# Patient Record
Sex: FEMALE | Race: WHITE | Employment: OTHER | URBAN - METROPOLITAN AREA
[De-identification: names, ages, dates, MRNs, and addresses within clinical notes are randomized per-mention and may not be internally consistent; named-entity substitution may affect disease eponyms.]

---

## 2017-04-11 ENCOUNTER — ANESTHESIA EVENT (OUTPATIENT)
Dept: OBGYN | Facility: CLINIC | Age: 32
End: 2017-04-11
Payer: COMMERCIAL

## 2017-04-11 ENCOUNTER — ANESTHESIA (OUTPATIENT)
Dept: OBGYN | Facility: CLINIC | Age: 32
End: 2017-04-11
Payer: COMMERCIAL

## 2017-04-11 PROBLEM — O48.0 POST-TERM PREGNANCY, 40-42 WEEKS OF GESTATION: Status: ACTIVE | Noted: 2017-04-11

## 2017-04-11 PROCEDURE — 25000128 H RX IP 250 OP 636: Performed by: ANESTHESIOLOGY

## 2017-04-11 PROCEDURE — 37000011 ZZH ANESTHESIA WARD SERVICE

## 2017-04-11 RX ADMIN — ROPIVACAINE HYDROCHLORIDE 10 ML: 2 INJECTION, SOLUTION EPIDURAL; INFILTRATION at 06:36

## 2017-04-11 NOTE — ANESTHESIA PROCEDURE NOTES
Peripheral nerve/Neuraxial procedure note : epidural catheter  Pre-Procedure  Performed by SHOAIB GREEN  Location: OB      Pre-Anesthestic Checklist: patient identified, IV checked, risks and benefits discussed, informed consent and pre-op evaluation    Timeout  Correct Patient: Yes   Correct Procedure: Yes   Correct Site: Yes   Correct Laterality: N/A   Correct Position: Yes   Site Marked: N/A   .   Procedure Documentation    .    Procedure:    Epidural catheter.  Insertion Site:L3-4  (midline approach) Injection technique: LORT saline   Local skin infiltrated with mL of 1% lidocaine.       Patient Prep;mask, sterile gloves, povidone-iodine 7.5% surgical scrub, patient draped.  .  Needle: Touhy needle (17 G. 3.5 in). # of attempts: 1. # of redirects:.  Spinal Needle: . . . . .  Catheter threaded easily  5 cm epidural space.  .   .    Assessment/Narrative  Paresthesias: No.  .  .  Aspiration negative for heme or CSF  . Test dose of 3 mL lidocaine 1.5% w/ 1:200,000 epinephrine at. Test dose negative for signs of intravascular, subdural or intrathecal injection. Comments:  10cc 0.2% ropivicaine in divided doses

## 2018-01-01 NOTE — ANESTHESIA POSTPROCEDURE EVALUATION
Patient: Naomy Alvarez    * No procedures listed *    Diagnosis:* No pre-op diagnosis entered *  Diagnosis Additional Information: No value filed.    Anesthesia Type:  No value filed.    Note:  Anesthesia Post Evaluation    Patient location during evaluation: Floor  Patient participation: Able to fully participate in evaluation  Level of consciousness: awake  Pain management: adequate  Airway patency: patent  Cardiovascular status: acceptable  Respiratory status: acceptable  Hydration status: acceptable  PONV: none     Anesthetic complications: None    Comments: Uncomplicated RHETT        Last vitals:  Vitals:    04/11/17 2300 04/12/17 0810 04/12/17 1553   BP: 120/68 98/61 102/60   Pulse:   73   Resp: 16 16 16   Temp:   36.4  C (97.5  F)   SpO2:            Electronically Signed By: Dilcia Mota MD  April 12, 2017  4:11 PM  
35

## 2018-04-16 LAB
C TRACH DNA SPEC QL PROBE+SIG AMP: NEGATIVE
HBV SURFACE AG SERPL QL IA: NEGATIVE
HIV 1+2 AB+HIV1 P24 AG SERPL QL IA: NON REACTIVE
N GONORRHOEA DNA SPEC QL PROBE+SIG AMP: NEGATIVE
PLATELET # BLD AUTO: 202 10^9/L
RUBELLA ABY IGG: 1.15
RUBELLA ANTIBODY IGG QUANTITATIVE: NORMAL IU/ML
TREPONEMA ANTIBODIES: NON REACTIVE

## 2018-07-16 LAB — HEMOGLOBIN: 14 G/DL (ref 11.7–15.7)

## 2018-09-14 LAB — T PALLIDUM IGG SER QL: NON REACTIVE

## 2018-11-06 LAB — GROUP B STREP PCR: NEGATIVE

## 2018-12-01 ENCOUNTER — ANESTHESIA (OUTPATIENT)
Dept: OBGYN | Facility: CLINIC | Age: 33
End: 2018-12-01
Payer: COMMERCIAL

## 2018-12-01 ENCOUNTER — HOSPITAL ENCOUNTER (INPATIENT)
Facility: CLINIC | Age: 33
LOS: 2 days | Discharge: HOME HEALTH | End: 2018-12-03
Attending: SPECIALIST | Admitting: SPECIALIST
Payer: COMMERCIAL

## 2018-12-01 ENCOUNTER — ANESTHESIA EVENT (OUTPATIENT)
Dept: OBGYN | Facility: CLINIC | Age: 33
End: 2018-12-01
Payer: COMMERCIAL

## 2018-12-01 LAB
ABO + RH BLD: NORMAL
ABO + RH BLD: NORMAL
SPECIMEN EXP DATE BLD: NORMAL

## 2018-12-01 PROCEDURE — 25000128 H RX IP 250 OP 636: Performed by: ANESTHESIOLOGY

## 2018-12-01 PROCEDURE — 86900 BLOOD TYPING SEROLOGIC ABO: CPT | Performed by: SPECIALIST

## 2018-12-01 PROCEDURE — 27110038 ZZH RX 271: Performed by: ANESTHESIOLOGY

## 2018-12-01 PROCEDURE — 25000128 H RX IP 250 OP 636: Performed by: SPECIALIST

## 2018-12-01 PROCEDURE — 00HU33Z INSERTION OF INFUSION DEVICE INTO SPINAL CANAL, PERCUTANEOUS APPROACH: ICD-10-PCS | Performed by: ANESTHESIOLOGY

## 2018-12-01 PROCEDURE — 12000037 ZZH R&B POSTPARTUM INTERMEDIATE

## 2018-12-01 PROCEDURE — 3E033VJ INTRODUCTION OF OTHER HORMONE INTO PERIPHERAL VEIN, PERCUTANEOUS APPROACH: ICD-10-PCS | Performed by: OBSTETRICS & GYNECOLOGY

## 2018-12-01 PROCEDURE — 86901 BLOOD TYPING SEROLOGIC RH(D): CPT | Performed by: SPECIALIST

## 2018-12-01 PROCEDURE — 10907ZC DRAINAGE OF AMNIOTIC FLUID, THERAPEUTIC FROM PRODUCTS OF CONCEPTION, VIA NATURAL OR ARTIFICIAL OPENING: ICD-10-PCS | Performed by: OBSTETRICS & GYNECOLOGY

## 2018-12-01 PROCEDURE — 37000011 ZZH ANESTHESIA WARD SERVICE

## 2018-12-01 PROCEDURE — 86780 TREPONEMA PALLIDUM: CPT | Performed by: SPECIALIST

## 2018-12-01 PROCEDURE — 25000125 ZZHC RX 250: Performed by: OBSTETRICS & GYNECOLOGY

## 2018-12-01 PROCEDURE — 36415 COLL VENOUS BLD VENIPUNCTURE: CPT | Performed by: SPECIALIST

## 2018-12-01 PROCEDURE — 25000132 ZZH RX MED GY IP 250 OP 250 PS 637: Performed by: OBSTETRICS & GYNECOLOGY

## 2018-12-01 PROCEDURE — 3E0R3BZ INTRODUCTION OF ANESTHETIC AGENT INTO SPINAL CANAL, PERCUTANEOUS APPROACH: ICD-10-PCS | Performed by: ANESTHESIOLOGY

## 2018-12-01 PROCEDURE — 25000125 ZZHC RX 250: Performed by: ANESTHESIOLOGY

## 2018-12-01 PROCEDURE — 72200001 ZZH LABOR CARE VAGINAL DELIVERY SINGLE

## 2018-12-01 PROCEDURE — 25000125 ZZHC RX 250: Performed by: SPECIALIST

## 2018-12-01 RX ORDER — BISACODYL 10 MG
10 SUPPOSITORY, RECTAL RECTAL DAILY PRN
Status: DISCONTINUED | OUTPATIENT
Start: 2018-12-03 | End: 2018-12-03 | Stop reason: HOSPADM

## 2018-12-01 RX ORDER — ACETAMINOPHEN 325 MG/1
650 TABLET ORAL EVERY 4 HOURS PRN
Status: DISCONTINUED | OUTPATIENT
Start: 2018-12-01 | End: 2018-12-01

## 2018-12-01 RX ORDER — NALBUPHINE HYDROCHLORIDE 10 MG/ML
2.5-5 INJECTION, SOLUTION INTRAMUSCULAR; INTRAVENOUS; SUBCUTANEOUS EVERY 6 HOURS PRN
Status: DISCONTINUED | OUTPATIENT
Start: 2018-12-01 | End: 2018-12-01

## 2018-12-01 RX ORDER — OXYTOCIN 10 [USP'U]/ML
10 INJECTION, SOLUTION INTRAMUSCULAR; INTRAVENOUS
Status: DISCONTINUED | OUTPATIENT
Start: 2018-12-01 | End: 2018-12-03 | Stop reason: HOSPADM

## 2018-12-01 RX ORDER — NALOXONE HYDROCHLORIDE 0.4 MG/ML
.1-.4 INJECTION, SOLUTION INTRAMUSCULAR; INTRAVENOUS; SUBCUTANEOUS
Status: DISCONTINUED | OUTPATIENT
Start: 2018-12-01 | End: 2018-12-03 | Stop reason: HOSPADM

## 2018-12-01 RX ORDER — FENTANYL CITRATE 50 UG/ML
50-100 INJECTION, SOLUTION INTRAMUSCULAR; INTRAVENOUS
Status: DISCONTINUED | OUTPATIENT
Start: 2018-12-01 | End: 2018-12-01

## 2018-12-01 RX ORDER — OXYTOCIN/0.9 % SODIUM CHLORIDE 30/500 ML
100 PLASTIC BAG, INJECTION (ML) INTRAVENOUS CONTINUOUS
Status: DISCONTINUED | OUTPATIENT
Start: 2018-12-01 | End: 2018-12-03 | Stop reason: HOSPADM

## 2018-12-01 RX ORDER — LIDOCAINE HYDROCHLORIDE AND EPINEPHRINE 15; 5 MG/ML; UG/ML
INJECTION, SOLUTION EPIDURAL PRN
Status: DISCONTINUED | OUTPATIENT
Start: 2018-12-01 | End: 2018-12-02 | Stop reason: HOSPADM

## 2018-12-01 RX ORDER — METHYLERGONOVINE MALEATE 0.2 MG/ML
200 INJECTION INTRAVENOUS
Status: DISCONTINUED | OUTPATIENT
Start: 2018-12-01 | End: 2018-12-01

## 2018-12-01 RX ORDER — EPHEDRINE SULFATE 50 MG/ML
5 INJECTION, SOLUTION INTRAMUSCULAR; INTRAVENOUS; SUBCUTANEOUS
Status: DISCONTINUED | OUTPATIENT
Start: 2018-12-01 | End: 2018-12-01

## 2018-12-01 RX ORDER — CARBOPROST TROMETHAMINE 250 UG/ML
250 INJECTION, SOLUTION INTRAMUSCULAR
Status: DISCONTINUED | OUTPATIENT
Start: 2018-12-01 | End: 2018-12-01

## 2018-12-01 RX ORDER — OXYTOCIN/0.9 % SODIUM CHLORIDE 30/500 ML
100-340 PLASTIC BAG, INJECTION (ML) INTRAVENOUS CONTINUOUS PRN
Status: DISCONTINUED | OUTPATIENT
Start: 2018-12-01 | End: 2018-12-01

## 2018-12-01 RX ORDER — HYDROCORTISONE 2.5 %
CREAM (GRAM) TOPICAL 3 TIMES DAILY PRN
Status: DISCONTINUED | OUTPATIENT
Start: 2018-12-01 | End: 2018-12-03 | Stop reason: HOSPADM

## 2018-12-01 RX ORDER — ACETAMINOPHEN 325 MG/1
650 TABLET ORAL EVERY 4 HOURS PRN
Status: DISCONTINUED | OUTPATIENT
Start: 2018-12-01 | End: 2018-12-03 | Stop reason: HOSPADM

## 2018-12-01 RX ORDER — DOCUSATE SODIUM 100 MG/1
100 CAPSULE, LIQUID FILLED ORAL 2 TIMES DAILY
Status: DISCONTINUED | OUTPATIENT
Start: 2018-12-01 | End: 2018-12-03 | Stop reason: HOSPADM

## 2018-12-01 RX ORDER — LIDOCAINE 40 MG/G
CREAM TOPICAL
Status: DISCONTINUED | OUTPATIENT
Start: 2018-12-01 | End: 2018-12-01

## 2018-12-01 RX ORDER — OXYCODONE HYDROCHLORIDE 5 MG/1
5 TABLET ORAL EVERY 4 HOURS PRN
Status: DISCONTINUED | OUTPATIENT
Start: 2018-12-01 | End: 2018-12-03 | Stop reason: HOSPADM

## 2018-12-01 RX ORDER — OXYTOCIN/0.9 % SODIUM CHLORIDE 30/500 ML
1-24 PLASTIC BAG, INJECTION (ML) INTRAVENOUS CONTINUOUS
Status: DISCONTINUED | OUTPATIENT
Start: 2018-12-01 | End: 2018-12-01

## 2018-12-01 RX ORDER — SODIUM CHLORIDE, SODIUM LACTATE, POTASSIUM CHLORIDE, CALCIUM CHLORIDE 600; 310; 30; 20 MG/100ML; MG/100ML; MG/100ML; MG/100ML
INJECTION, SOLUTION INTRAVENOUS CONTINUOUS
Status: DISCONTINUED | OUTPATIENT
Start: 2018-12-01 | End: 2018-12-01

## 2018-12-01 RX ORDER — OXYCODONE AND ACETAMINOPHEN 5; 325 MG/1; MG/1
1 TABLET ORAL
Status: DISCONTINUED | OUTPATIENT
Start: 2018-12-01 | End: 2018-12-01

## 2018-12-01 RX ORDER — IBUPROFEN 400 MG/1
800 TABLET, FILM COATED ORAL
Status: DISCONTINUED | OUTPATIENT
Start: 2018-12-01 | End: 2018-12-01

## 2018-12-01 RX ORDER — MISOPROSTOL 200 UG/1
800 TABLET ORAL
Status: DISCONTINUED | OUTPATIENT
Start: 2018-12-01 | End: 2018-12-03 | Stop reason: HOSPADM

## 2018-12-01 RX ORDER — FENTANYL CITRATE 50 UG/ML
INJECTION, SOLUTION INTRAMUSCULAR; INTRAVENOUS PRN
Status: DISCONTINUED | OUTPATIENT
Start: 2018-12-01 | End: 2018-12-02 | Stop reason: HOSPADM

## 2018-12-01 RX ORDER — ONDANSETRON 2 MG/ML
4 INJECTION INTRAMUSCULAR; INTRAVENOUS EVERY 6 HOURS PRN
Status: DISCONTINUED | OUTPATIENT
Start: 2018-12-01 | End: 2018-12-01

## 2018-12-01 RX ORDER — ROPIVACAINE HYDROCHLORIDE 2 MG/ML
10 INJECTION, SOLUTION EPIDURAL; INFILTRATION; PERINEURAL ONCE
Status: COMPLETED | OUTPATIENT
Start: 2018-12-01 | End: 2018-12-01

## 2018-12-01 RX ORDER — LANOLIN 100 %
OINTMENT (GRAM) TOPICAL
Status: DISCONTINUED | OUTPATIENT
Start: 2018-12-01 | End: 2018-12-03 | Stop reason: HOSPADM

## 2018-12-01 RX ORDER — OXYTOCIN 10 [USP'U]/ML
10 INJECTION, SOLUTION INTRAMUSCULAR; INTRAVENOUS
Status: DISCONTINUED | OUTPATIENT
Start: 2018-12-01 | End: 2018-12-01

## 2018-12-01 RX ORDER — NALOXONE HYDROCHLORIDE 0.4 MG/ML
.1-.4 INJECTION, SOLUTION INTRAMUSCULAR; INTRAVENOUS; SUBCUTANEOUS
Status: DISCONTINUED | OUTPATIENT
Start: 2018-12-01 | End: 2018-12-01

## 2018-12-01 RX ORDER — IBUPROFEN 400 MG/1
800 TABLET, FILM COATED ORAL EVERY 6 HOURS PRN
Status: DISCONTINUED | OUTPATIENT
Start: 2018-12-01 | End: 2018-12-03 | Stop reason: HOSPADM

## 2018-12-01 RX ORDER — OXYTOCIN/0.9 % SODIUM CHLORIDE 30/500 ML
340 PLASTIC BAG, INJECTION (ML) INTRAVENOUS CONTINUOUS PRN
Status: DISCONTINUED | OUTPATIENT
Start: 2018-12-01 | End: 2018-12-03 | Stop reason: HOSPADM

## 2018-12-01 RX ADMIN — SODIUM CHLORIDE, POTASSIUM CHLORIDE, SODIUM LACTATE AND CALCIUM CHLORIDE 1000 ML: 600; 310; 30; 20 INJECTION, SOLUTION INTRAVENOUS at 12:58

## 2018-12-01 RX ADMIN — ONDANSETRON 4 MG: 2 INJECTION INTRAMUSCULAR; INTRAVENOUS at 16:00

## 2018-12-01 RX ADMIN — FENTANYL CITRATE 100 MCG: 50 INJECTION, SOLUTION INTRAMUSCULAR; INTRAVENOUS at 13:53

## 2018-12-01 RX ADMIN — ACETAMINOPHEN 650 MG: 325 TABLET, FILM COATED ORAL at 18:57

## 2018-12-01 RX ADMIN — LIDOCAINE HYDROCHLORIDE,EPINEPHRINE BITARTRATE 3 ML: 15; .005 INJECTION, SOLUTION EPIDURAL; INFILTRATION; INTRACAUDAL; PERINEURAL at 13:49

## 2018-12-01 RX ADMIN — Medication 15 ML/HR: at 14:00

## 2018-12-01 RX ADMIN — OXYTOCIN-SODIUM CHLORIDE 0.9% IV SOLN 30 UNIT/500ML 1 MILLI-UNITS/MIN: 30-0.9/5 SOLUTION at 08:59

## 2018-12-01 RX ADMIN — IBUPROFEN 800 MG: 400 TABLET ORAL at 18:57

## 2018-12-01 RX ADMIN — ROPIVACAINE HYDROCHLORIDE 10 ML: 2 INJECTION, SOLUTION EPIDURAL; INFILTRATION at 13:57

## 2018-12-01 RX ADMIN — LIDOCAINE HYDROCHLORIDE,EPINEPHRINE BITARTRATE 2 ML: 15; .005 INJECTION, SOLUTION EPIDURAL; INFILTRATION; INTRACAUDAL; PERINEURAL at 13:53

## 2018-12-01 RX ADMIN — SODIUM CHLORIDE, POTASSIUM CHLORIDE, SODIUM LACTATE AND CALCIUM CHLORIDE: 600; 310; 30; 20 INJECTION, SOLUTION INTRAVENOUS at 08:56

## 2018-12-01 RX ADMIN — OXYTOCIN-SODIUM CHLORIDE 0.9% IV SOLN 30 UNIT/500ML 100 ML/HR: 30-0.9/5 SOLUTION at 18:02

## 2018-12-01 RX ADMIN — OXYTOCIN-SODIUM CHLORIDE 0.9% IV SOLN 30 UNIT/500ML 340 ML/HR: 30-0.9/5 SOLUTION at 17:31

## 2018-12-01 RX ADMIN — DOCUSATE SODIUM 100 MG: 100 CAPSULE, LIQUID FILLED ORAL at 21:57

## 2018-12-01 ASSESSMENT — ENCOUNTER SYMPTOMS
SEIZURES: 0
DYSRHYTHMIAS: 0

## 2018-12-01 ASSESSMENT — ACTIVITIES OF DAILY LIVING (ADL)
SWALLOWING: 0-->SWALLOWS FOODS/LIQUIDS WITHOUT DIFFICULTY
TOILETING: 0-->INDEPENDENT
DRESS: 0-->INDEPENDENT
AMBULATION: 0-->INDEPENDENT
COGNITION: 0 - NO COGNITION ISSUES REPORTED
BATHING: 0-->INDEPENDENT
RETIRED_EATING: 0-->INDEPENDENT
RETIRED_COMMUNICATION: 0-->UNDERSTANDS/COMMUNICATES WITHOUT DIFFICULTY
TRANSFERRING: 0-->INDEPENDENT
FALL_HISTORY_WITHIN_LAST_SIX_MONTHS: NO

## 2018-12-01 NOTE — H&P
"  2018    Naomy Alvarez  [id number]            OB Admit History & Physical      Ms. Alvarez  is here for elective induction.    She has noticed normal FM, no LOF    No LMP recorded. Patient is pregnant.   Her Estimated Date of Delivery: Data Unavailable  , making her Unknown  wks.      Estimated body mass index is 22.85 kg/(m^2) as calculated from the following:    Height as of 4/10/17: 1.88 m (6' 2\").    Weight as of 4/10/17: 80.7 kg (178 lb).  Her prenatal course has been uncomplicated     See prenatal for labs.  - GBBS, Rubella  Immune, RH +    Estimated fetal weight= 7.5#       She is a 33 year old   Her OB history:   Obstetric History       T1      L1     SAB1   TAB0   Ectopic0   Multiple0   Live Births1       # Outcome Date GA Lbr Wojciech/2nd Weight Sex Delivery Anes PTL Lv   3 Current            2 Term 17 41w1d 02:55 / 01:31 3.36 kg (7 lb 6.5 oz) M Vag-Spont EPI N JENNIFER      Name: LEI ALVAREZ      Apgar1:  8                Apgar5: 9   1 SAB 16                     No past medical history on file.     No past surgical history on file.      No current outpatient prescriptions on file.       Allergies: Review of patient's allergies indicates no known allergies.      REVIEW OF SYSTEMS:  NEUROLOGIC:  Negative  EYES:  Negative  ENT:  Negative  GI:  Negative  BREAST:  Negative  :  Negative  GYN:  Negative  CV:  Negative  PULMONARY:  Negative  MUSCULOSKELETAL:  Negative  PSYCH:  Negative        Social History     Social History     Marital status:      Spouse name: N/A     Number of children: N/A     Years of education: N/A     Occupational History     Not on file.     Social History Main Topics     Smoking status: Never Smoker     Smokeless tobacco: Not on file     Alcohol use Not on file     Drug use: No     Sexual activity: Not on file     Other Topics Concern     Not on file     Social History Narrative      No family history on file.          Vitals:   FHT reactive  With " contractions minimal    Alert Awake in NAD  HEENT grossly normal  Neck: no lymphadenopathy or thryoidomegaly  Lungs clear  Back no spinal or CVAT  Heart RRR  ABD gravid, vertex on exam   Pelvic:  no fluid noted, no blood noted  Cervix is 2 cm / 60 % effaced at -2 station  EXT:  tr edema or calf tenderness  Neuro:  intact    Assessment:  IUP at 39+ weeks    Plan:  Pitocin induction  Analgesia as desired    [unfilled]      MATTI LAMA MD  Dept of OB/GYN  December 1, 2018

## 2018-12-01 NOTE — ANESTHESIA PREPROCEDURE EVALUATION
Procedure: RHETT  Preop diagnosis: IUP/Labor     No Known Allergies  History reviewed. No pertinent past medical history.  Past Surgical History:   Procedure Laterality Date     HC TOOTH EXTRACTION W/FORCEP      Wishek Teeth Removed     Social History   Substance Use Topics     Smoking status: Never Smoker     Smokeless tobacco: Never Used     Alcohol use No     Prior to Admission medications    Medication Sig Start Date End Date Taking? Authorizing Provider   Prenatal MV-Min-Fe Fum-FA-DHA (PRENATAL 1 PO)    Yes Reported, Patient     Current Facility-Administered Medications Ordered in Epic   Medication Dose Route Frequency Last Rate Last Dose     acetaminophen (TYLENOL) tablet 650 mg  650 mg Oral Q4H PRN         carboprost (HEMABATE) injection 250 mcg  250 mcg Intramuscular Once PRN         ePHEDrine injection 5 mg  5 mg Intravenous Q3 Min PRN         fentaNYL (PF) (SUBLIMAZE) injection  mcg   mcg Intravenous Q1H PRN         fentaNYL (PF) (SUBLIMAZE) injection   EPIDURAL PRN   100 mcg at 12/01/18 1353     fentaNYL (SUBLIMAZE) 2 mcg/mL, ropivacaine (NAROPIN) 0.2% in NaCl 0.9% EPIDURAL infusion   EPIDURAL Continuous 15 mL/hr at 12/01/18 1400 15 mL/hr at 12/01/18 1400     ibuprofen (ADVIL/MOTRIN) tablet 800 mg  800 mg Oral Once PRN         lactated ringers BOLUS 250 mL  250 mL Intravenous Once PRN         lactated ringers BOLUS 500 mL  500 mL Intravenous Once PRN         lactated ringers infusion   Intravenous Continuous 125 mL/hr at 12/01/18 0856       lactated ringers infusion   Intravenous Continuous         lidocaine (LMX4) cream   Topical Q1H PRN         lidocaine 1 % 0.1-20 mL  0.1-20 mL Subcutaneous Once PRN         lidocaine 1 % 1 mL  1 mL Other Q1H PRN         lidocaine-EPINEPHrine 1.5 %-1:209238 injection    PRN   2 mL at 12/01/18 1353     medication instruction   Does not apply Continuous PRN         medication instruction   Does not apply Continuous PRN         Medication Instructions:  "misoprostol (CYTOTEC)- Nurse to discuss ordering with provider, if needed. Ordered via \"OB misoprostol (CYTOTEC) Postpartum Hemorrhage PANEL\"   Does not apply Continuous PRN         methylergonovine (METHERGINE) injection 200 mcg  200 mcg Intramuscular Once PRN         nalbuphine (NUBAIN) injection 2.5-5 mg  2.5-5 mg Intravenous Q6H PRN         naloxone (NARCAN) injection 0.1-0.4 mg  0.1-0.4 mg Intravenous Q2 Min PRN         naloxone (NARCAN) injection 0.1-0.4 mg  0.1-0.4 mg Intravenous Q2 Min PRN         ondansetron (ZOFRAN) injection 4 mg  4 mg Intravenous Q6H PRN         Opioid plan postpartum - medication instruction   Does not apply Continuous PRN         oxyCODONE-acetaminophen (PERCOCET) 5-325 MG per tablet 1 tablet  1 tablet Oral Once PRN         oxytocin (PITOCIN) 30 units in 500 mL 0.9% NaCl infusion  100-340 mL/hr Intravenous Continuous PRN         oxytocin (PITOCIN) 30 units in 500 mL 0.9% NaCl infusion  1-24 emily-units/min Intravenous Continuous 11 mL/hr at 12/01/18 1130 11 emily-units/min at 12/01/18 1130     oxytocin (PITOCIN) injection 10 Units  10 Units Intramuscular Once PRN         sodium chloride (PF) 0.9% PF flush 3 mL  3 mL Intracatheter Q1H PRN         sodium chloride (PF) 0.9% PF flush 3 mL  3 mL Intracatheter Q8H         tranexamic acid (CYKLOKAPRON) Bolus 1g vial attach to NaCl 50 or 100 mL bag ADULT  1 g Intravenous Q30 Min PRN         No current [facility]-ordered outpatient prescriptions on file.       fentaNYL-ropivacaine 15 mL/hr (12/01/18 1400)     lactated ringers 125 mL/hr at 12/01/18 0856     lactated ringers       - MEDICATION INSTRUCTIONS -       - MEDICATION INSTRUCTIONS -       - MEDICATION INSTRUCTIONS -       - MEDICATION INSTRUCTIONS -       oxytocin in 0.9% NaCl       oxytocin in 0.9% NaCl 11 emily-units/min (12/01/18 1130)     Wt Readings from Last 1 Encounters:   12/01/18 81.6 kg (180 lb)     Temp Readings from Last 1 Encounters:   12/01/18 36.8  C (98.3  F) (Temporal)     BP " "Readings from Last 6 Encounters:   12/01/18 124/76   04/13/17 107/61     Pulse Readings from Last 4 Encounters:   12/01/18 78   04/13/17 50     Resp Readings from Last 1 Encounters:   12/01/18 14     SpO2 Readings from Last 1 Encounters:   04/11/17 97%     Recent Labs   Lab Test  12/01/18   0838   ABO  O   RH  Pos     Anesthesia Evaluation     . Pt has had prior anesthetic. Type: Regional    No history of anesthetic complications          ROS/MED HX    ENT/Pulmonary:      (-) asthma and sleep apnea   Neurologic:      (-) seizures and CVA   Cardiovascular:        (-) hypertension, syncope, arrhythmias and irregular heartbeat/palpitations   METS/Exercise Tolerance:     Hematologic: Comments: Denies EZ bleeding/bruising/blood thinner use        Musculoskeletal:         GI/Hepatic:     (+) GERD      (-) liver disease   Renal/Genitourinary:      (-) renal disease   Endo:      (-) Type II DM and thyroid disease   Psychiatric:         Infectious Disease:         Malignancy:         Other:                                    Anesthesia Plan      History & Physical Review      ASA Status:  2 .        Plan for Epidural     Initial Epidural Rate set to 15 mL/hr given 6'2\" height.       Postoperative Care      Consents  Anesthetic plan, risks, benefits and alternatives discussed with:  Patient and Spouse..        "

## 2018-12-01 NOTE — ANESTHESIA PROCEDURE NOTES
Peripheral nerve/Neuraxial procedure note : epidural catheter  Pre-Procedure  Performed by MIGUEL ANGEL CHAMBERLAIN  Location: OB      Pre-Anesthestic Checklist: patient identified, IV checked, risks and benefits discussed, informed consent, monitors and equipment checked, pre-op evaluation and at physician/surgeon's request    Timeout  Correct Patient: Yes   Correct Procedure: Yes   Correct Site: Yes   Correct Laterality: N/A   Correct Position: Yes   Site Marked: N/A   .   Procedure Documentation    .    Procedure:    Epidural catheter.  Insertion Site:L3-4  (midline approach) Injection technique: LORT saline   Local skin infiltrated with 3 mL of 1% lidocaine.  PRESTON at 4.5 cm     Patient Prep;mask, sterile gloves, povidone-iodine 7.5% surgical scrub, patient draped.  .  Needle: Touhy needle Needle Gauge: 17.    Needle Length (Inches) 3.5  # of attempts: 1 and # of redirects: : 0. .   Catheter: 19 G . .  Catheter threaded easily  3 cm epidural space.  7.5 cm at skin.   .    Assessment/Narrative  Paresthesias: No.  .  .  Aspiration negative for heme or CSF  . Test dose of 3 mL lidocaine 1.5% w/ 1:200,000 epinephrine at 13:49.  Test dose negative for signs of intravascular, subdural or intrathecal injection. Comments:  Pt tolerated well.   Taped sterile and secure.   FHTs stable post-procedure.   No complications.

## 2018-12-01 NOTE — PLAN OF CARE
0801: Pt arrived & oriented to room & unit routines. Plans for cares for today discussed with both pt & spouse (Dante) at this time & they both state they are in agreement with current plans. Pt denies feeling any contractions or any vaginal leaking, discharge, or bleeding. Pt attached to monitors.  0815: Dr. MORGAN Alfredo to bedside to discuss plans for cares for induction with pt. After conversation with MD, pt is in agreement with induction plans per MD recommendation.  0845: SVE 2/60/-2  0859: IV Pitocin infusion started at 1 mU/min.  0930: IV Pitocin increased to 3 mU/min  1000: IV Pitocin increased to 5 mU/min.  1030: IV Pitocin increased to 7 mU/min  1100: IV Pitocin increased to 9 mU/min, pt reports that her cramping has increased a little, however not by much, as pt states she is still comfortable.  1130: IV Pitocin increased to 11 mU/min. Pt reports that her uterine cramping is getting stronger, rated at 3/10 on pain scale now.   1200: Dr. MORGAN Alfredo to bedside for pt status update, SVE, & AROM. SVE per MD 3.5/70/-2, AROM per MD for scant amount of clear fluid.  1258: Pt requesting epidural placement, 1000 mL IV fluid bolus started in preparation for this.   1335: Dr. Ayaan ALICEA to bedside (he was already in department) for epidural placement. 100 mcg vial of Fentanyl & 10 mL vial of Ropivacaine handed off to Dr. Ayaan ALICEA for epidural injection. Pt sitting up on edge of bed for epidural placement.  1400: Epidural infusion started & pt placed into left tilt position.  1430: SVE 6.5/80/-1, pt repositioned into left lateral, IV Pitocin increased to 13 mU/min. Dr. MORGAN Alfredo in department & updated on pt status & SVE.   1530: Pt repositioned into right lateral, Dr. Alfredo to bedside for SVE, SVE per MD 6.5/80/-1. IV Pitocin increased to 15 mU/min.  1630: SVE 9/95/0, pt straight cathed for 1000 mL clear yellow urine, pt repositioned onto left lateral.  1652: Dr. Alfredo paged with pt status update  & SVE.  1653: Dr. Alfredo in department & updated on pt status & SVE.   1714: SVE per Dr. Alfredo 10/100/0  1716: Pt positioned into stirrups & pushing started.  1728: Viable baby boy born

## 2018-12-01 NOTE — L&D DELIVERY NOTE
Naomy Alvarez is a 33 year old  at 39+4 weeks presented for elective induction. Uncomplicated pregnancy  itocin begun, AROM for clear fluid. Received epidural with good relief. Progressed well through labor. Pushed very well. Reassuring tracing throughout   male, Apgars 9 & 9, wt NA from CARLEY (fromLOP) vertex over intact perineum. Delayed cord clamping, placenta spont, 3CV intact.  Mother and baby doing well

## 2018-12-01 NOTE — PROGRESS NOTES
"Comfortable with epidural  /52  Pulse 78  Temp 98.7  F (37.1  C) (Temporal)  Resp 14  Ht 1.88 m (6' 2\")  Wt 81.6 kg (180 lb)  SpO2 97%  BMI 23.11 kg/m2  Ctxs every 3-6\"  FHR moderate variability  Cx 6-7/70/-1    "

## 2018-12-01 NOTE — IP AVS SNAPSHOT
MRN:[id number]                      After Visit Summary   12/1/2018    Naomy Alvarez    MRN: [id number]           Thank you!     Thank you for choosing [facility] for your care. Our goal is always to provide you with excellent care. Hearing back from our patients is one way we can continue to improve our services. Please take a few minutes to complete the written survey that you may receive in the mail after you visit with us. Thank you!        Patient Information     Date Of Birth          1985        Designated Caregiver       Most Recent Value    Caregiver    Will someone help with your care after discharge? no    Name of designated caregiver Ramirez Alvarez    Phone number of caregiver [phone]      About your hospital stay     You were admitted on:  December 1, 2018 You last received care in the:  [address]    You were discharged on:  December 3, 2018        Reason for your hospital stay       Maternity care                  Who to Call     For medical emergencies, please call 911.  For non-urgent questions about your medical care, please call your primary care provider or clinic, [phone]          Attending Provider     Provider Specialty    Ana Alfredo MD OB/Gyn       Primary Care Provider Office Phone # Fax #    Ceci ODELL VarelachrisDO nidhi [phone]      After Care Instructions     Activity       Review discharge instructions            Diet       Resume previous diet            Discharge Instructions - Postpartum visit       Schedule postpartum visit with your provider and return to clinic in 6 weeks.                  Further instructions from your care team       Postpartum Vaginal Delivery Instructions    Activity       Ask family and friends for help when you need it.    Do not place anything in your vagina for 6 weeks.    You are not restricted on other activities, but take it easy for a few weeks to allow your body to recover  "from delivery.  You are able to do any activities you feel up to that point.    No driving until you have stopped taking your pain medications (usually two weeks after delivery).     Call your health care provider if you have any of these symptoms:       Increased pain, swelling, redness, or fluid around your stiches from an episiotomy or perineal tear.    A fever above 100.4 F (38 C) with or without chills when placing a thermometer under your tongue.    You soak a sanitary pad with blood within 1 hour, or you see blood clots larger than a golf ball.    Bleeding that lasts more than 6 weeks.    Vaginal discharge that smells bad.    Severe pain, cramping or tenderness in your lower belly area.    A need to urinate more frequently (use the toilet more often), more urgently (use the toilet very quickly), or it burns when you urinate.    Nausea and vomiting.    Redness, swelling or pain around a vein in your leg.    Problems breastfeeding or a red or painful area on your breast.    Chest pain and cough or are gasping for air.    Problems coping with sadness, anxiety, or depression.  If you have any concerns about hurting yourself or the baby, call your provider immediately.     You have questions or concerns after you return home.     Keep your hands clean:  Always wash your hands before touching your perineal area and stitches.  This helps reduce your risk of infection.  If your hands aren't dirty, you may use an alcohol hand-rub to clean your hands. Keep your nails clean and short.        Pending Results     No orders found from 11/29/2018 to 12/2/2018.            Admission Information     Date & Time Provider Department Dept. Phone    12/1/2018 Ana Alfredo MD [address] [phone]      Your Vitals Were     Blood Pressure Pulse Temperature Respirations Height Weight    113/70 78 97.5  F (36.4  C) (Oral) 16 1.88 m (6' 2\") 81.6 kg (180 lb)    Pulse Oximetry BMI (Body Mass " "Index)                99% 23.11 kg/m2          [employer] Information     [employer] lets you send messages to your doctor, view your test results, renew your prescriptions, schedule appointments and more. To sign up, go to [email or web address] . Click on \"Log in\" on the left side of the screen, which will take you to the Welcome page. Then click on \"Sign up Now\" on the right side of the page.     You will be asked to enter the access code listed below, as well as some personal information. Please follow the directions to create your username and password.     Your access code is: [email or web address]  Expires: 3/3/2019  8:23 AM     Your access code will  in 90 days. If you need help or a new code, please call your [place] clinic or [phone].        Care EveryWhere ID     This is your Care EveryWhere ID. This could be used by other organizations to access your [facility] medical records  [id number]        Equal Access to Services     [facility] AH: Hadii aad ku hadasho Soomaali, waaxda luqadaha, qaybta kaalmada adeegyada, waxay ramilain hayyasmanyn erlinda killian . So [facility] [phone].    ATENCIÓN: Si habla español, tiene a arevalo disposición servicios gratuitos de asistencia lingüística. Llame al [phone].    We comply with applicable federal civil rights laws and [place] laws. We do not discriminate on the basis of race, color, national origin, age, disability, sex, sexual orientation, or gender identity.               Review of your medicines      CONTINUE these medicines which have NOT CHANGED        Dose / Directions    PRENATAL 1 PO        Refills:  0                Protect others around you: Learn how to safely use, store and throw away your medicines at www.disposemymeds.org.             Medication List: This is a list of all your medications and when to take them. Check marks below indicate your daily home schedule. Keep this list as a reference.      Medications           Morning Afternoon Evening " Bedtime As Needed    PRENATAL 1 PO

## 2018-12-01 NOTE — IP AVS SNAPSHOT
[address]    [place] [address]    Phone:  [phone]                                       After Visit Summary   12/1/2018    Naomy Alvarez    MRN: [id number]           After Visit Summary Signature Page     I have received my discharge instructions, and my questions have been answered. I have discussed any challenges I see with this plan with the nurse or doctor.    ..........................................................................................................................................  Patient/Patient Representative Signature      ..........................................................................................................................................  Patient Representative Print Name and Relationship to Patient    ..................................................               ................................................  Date                                   Time    ..........................................................................................................................................  Reviewed by Signature/Title    ...................................................              ..............................................  Date                                               Time          22EPIC Rev 08/18

## 2018-12-01 NOTE — PROGRESS NOTES
"Starting to feel contractions  /64  Pulse 78  Temp 98.9  F (37.2  C) (Temporal)  Resp 14  Ht 1.88 m (6' 2\")  Wt 81.6 kg (180 lb)  BMI 23.11 kg/m2  Ctxs every 3-4 min  FHR 140s with moderate variability, no decels  Cx 3-4/80/-2  AROM- clear  "

## 2018-12-02 LAB — T PALLIDUM AB SER QL: NONREACTIVE

## 2018-12-02 PROCEDURE — 12000037 ZZH R&B POSTPARTUM INTERMEDIATE

## 2018-12-02 PROCEDURE — 25000132 ZZH RX MED GY IP 250 OP 250 PS 637: Performed by: OBSTETRICS & GYNECOLOGY

## 2018-12-02 RX ADMIN — IBUPROFEN 800 MG: 400 TABLET ORAL at 19:33

## 2018-12-02 RX ADMIN — DOCUSATE SODIUM 100 MG: 100 CAPSULE, LIQUID FILLED ORAL at 19:34

## 2018-12-02 RX ADMIN — ACETAMINOPHEN 650 MG: 325 TABLET, FILM COATED ORAL at 00:55

## 2018-12-02 RX ADMIN — ACETAMINOPHEN 650 MG: 325 TABLET, FILM COATED ORAL at 13:25

## 2018-12-02 RX ADMIN — IBUPROFEN 800 MG: 400 TABLET ORAL at 06:42

## 2018-12-02 RX ADMIN — IBUPROFEN 800 MG: 400 TABLET ORAL at 00:55

## 2018-12-02 RX ADMIN — ACETAMINOPHEN 650 MG: 325 TABLET, FILM COATED ORAL at 06:42

## 2018-12-02 RX ADMIN — DOCUSATE SODIUM 100 MG: 100 CAPSULE, LIQUID FILLED ORAL at 13:25

## 2018-12-02 RX ADMIN — ACETAMINOPHEN 650 MG: 325 TABLET, FILM COATED ORAL at 19:34

## 2018-12-02 RX ADMIN — IBUPROFEN 800 MG: 400 TABLET ORAL at 13:25

## 2018-12-02 NOTE — LACTATION NOTE
Attempted Initial visit. Naomy in shower at 0753.  Spoke to Cony CHESTER RN  And requested for the patient to let LC know when available. Sheila KHALILN, RN, PHN, RNC-MNN, IBCLC

## 2018-12-02 NOTE — LACTATION NOTE
Initial visit with Naomy and baby.    Breastfeeding general information reviewed.   Advised to breastfeed exclusively, on demand, avoid pacifiers, bottles and formula unless medically indicated.  Encouraged rooming in, skin to skin, feeding on demand 8-12x/day or sooner if baby cues.  Explained benefits of holding and skin to skin.  Encouraged lots of skin to skin. Instructed on hand expression.   Continues to nurse well per mom,baby has been spitting up.  Has a breast pump at home and plans to follow up with Chu CARSON.  Naomy very appreciative of LC visit.     No further questions at this time.   Will follow as needed.   Sheila Magdaleno BSN, RN, PHN, RNC-MNN, IBCLC

## 2018-12-02 NOTE — PROGRESS NOTES
"[facility]   Post-Partum Progress Note          Assessment and Plan:    Assessment:   Post-partum day #1  Induced vaginal delivery secondary to elective  L&D complications: None      Doing well.      Plan:   Ambulation encouraged  Breast feeding strategies discussed  Reportable signs and symptoms dicussed with the patient           Interval History:   Doing well.  Pain is well-controlled.  No fevers.  No history of foul-smelling vaginal discharge.  Good appetite.  Denies chest pain, shortness of breath, nausea or vomiting.  Vaginal bleeding is similar to a heavy menstrual flow.  Ambulatory.  Breastfeeding well.          Significant Problems:      Patient Active Problem List   Diagnosis     Indication for care in labor or delivery     Post-term pregnancy, 40-42 weeks of gestation     Meconium in amniotic fluid      (spontaneous vaginal delivery)             Review of Systems:    The patient denies any chest pain, shortness of breath, excessive pain, fever, chills, purulent drainage from the wound, nausea or vomiting.          Medications:   All medications related to the patient's surgery have been reviewed          Physical Exam:   All vitals stable  Blood pressure 105/59, pulse 78, temperature 97.4  F (36.3  C), temperature source Oral, resp. rate 16, height 1.88 m (6' 2\"), weight 81.6 kg (180 lb), SpO2 99 %, unknown if currently breastfeeding.  Uterine fundus is firm, non-tender and at the level of the umbilicus          Data:   All laboratory data related to this surgery reviewed  Lab Results   Component Value Date    HGB 14.0 2018       MATTI LAMA MD  "

## 2018-12-02 NOTE — PLAN OF CARE
Data: Naomy Alvarez transferred to 426 via wheelchair at 2000. Baby transferred via parent's arms.  Action: Receiving unit notified of transfer: Yes. Patient and family notified of room change. Report given to Day KENNEDY RN at 2005. Belongings sent to receiving unit. Accompanied by Registered Nurse. Oriented patient to surroundings. Call light within reach. ID bands double-checked with receiving RN.  Response: Patient tolerated transfer and is stable.

## 2018-12-02 NOTE — PLAN OF CARE
Problem: Patient Care Overview  Goal: Plan of Care/Patient Progress Review  Outcome: No Change  Up to floor at 2008 oriented to room /unit resources folder given infant safety reviewed questions answered  Vitals stable HR resting 46-50 pain controlled with tylenol and motrin  Up ambulated to bathroom voided adequately working on breast feeding  encourage to call with needs continue to monitor

## 2018-12-02 NOTE — PLAN OF CARE
Problem: Patient Care Overview  Goal: Plan of Care/Patient Progress Review  Outcome: Improving  VSS, fundus firm with no free flow or clots.  Enc to call with needs, questions and concerns

## 2018-12-02 NOTE — ANESTHESIA POSTPROCEDURE EVALUATION
Patient: Naomy Alvarez    * No procedures listed *    Diagnosis:* No pre-op diagnosis entered *  Diagnosis Additional Information: No value filed.    Anesthesia Type:  No value filed.    Note:  Anesthesia Post Evaluation    Patient location during evaluation: Floor and Bedside (Postpartum Unit)  Patient participation: Able to fully participate in evaluation  Level of consciousness: awake and alert  Pain management: adequate  Airway patency: patent  Cardiovascular status: acceptable and hemodynamically stable  Respiratory status: acceptable, nonlabored ventilation and unassisted  Hydration status: acceptable  PONV: none       Comments: Pt denies epidural-related complaints.         Last vitals:  Vitals:    12/02/18 0140 12/02/18 0642 12/02/18 0900   BP:   102/64   Pulse:      Resp: 16 16 16   Temp:   36.6  C (97.8  F)   SpO2:            Electronically Signed By: Ayaan Geiger MD  December 2, 2018  3:32 PM

## 2018-12-03 VITALS
OXYGEN SATURATION: 99 % | HEART RATE: 78 BPM | TEMPERATURE: 97.5 F | RESPIRATION RATE: 16 BRPM | WEIGHT: 180 LBS | SYSTOLIC BLOOD PRESSURE: 113 MMHG | DIASTOLIC BLOOD PRESSURE: 70 MMHG | BODY MASS INDEX: 24.38 KG/M2 | HEIGHT: 72 IN

## 2018-12-03 PROCEDURE — 25000132 ZZH RX MED GY IP 250 OP 250 PS 637: Performed by: OBSTETRICS & GYNECOLOGY

## 2018-12-03 RX ADMIN — IBUPROFEN 800 MG: 400 TABLET ORAL at 10:56

## 2018-12-03 RX ADMIN — IBUPROFEN 800 MG: 400 TABLET ORAL at 03:16

## 2018-12-03 RX ADMIN — ACETAMINOPHEN 650 MG: 325 TABLET, FILM COATED ORAL at 03:17

## 2018-12-03 RX ADMIN — ACETAMINOPHEN 650 MG: 325 TABLET, FILM COATED ORAL at 10:56

## 2018-12-03 RX ADMIN — DOCUSATE SODIUM 100 MG: 100 CAPSULE, LIQUID FILLED ORAL at 10:56

## 2018-12-03 NOTE — PROGRESS NOTES
[facility]   Post-Partum Progress Note          Assessment and Plan:    Assessment:   Post-partum day #2  Normal spontaneous vaginal delivery  L&D complications: None      Doing well.      Plan:   Discharge later today           Interval History:   Doing well.  Pain is well-controlled.  No fevers.  No history of foul-smelling vaginal discharge.  Good appetite.  Denies chest pain, shortness of breath, nausea or vomiting.  Vaginal bleeding is similar to a heavy menstrual flow.  Ambulatory.  Breastfeeding well.          Significant Problems:    None          Review of Systems:    The patient denies any chest pain, shortness of breath, excessive pain, fever, chills, purulent drainage from the wound, nausea or vomiting.          Medications:   All medications related to the patient's surgery have been reviewed          Physical Exam:   All vitals stable  Uterine fundus is firm, non-tender and at the level of the umbilicus          Data:   All laboratory data related to this surgery reviewed  All imaging studies related to this surgery reviewed    Noreen Lockett MD

## 2018-12-03 NOTE — PLAN OF CARE
Problem: Patient Care Overview  Goal: Plan of Care/Patient Progress Review  Outcome: Improving  VSS.  Pain well controlled with tylenol and ibuprofen.  Up independently in room.  Breastfeeding well. Independent with  cares.  providing support at beside. Questions encouraged. On pathway. Continue to monitor and notify MD as needed. Plan to discharge 12/3.

## 2018-12-03 NOTE — DISCHARGE SUMMARY
[facility] Discharge Summary    Naomy Alvarez MRN# [id number]   Age: 33 year old YOB: 1985     Date of Admission:  2018  Date of Discharge::  12/3/18  Admitting Physician:  Ana Alfredo MD  Discharge Physician:  Noreen Lockett MD     Home clinic: [facility]          Admission Diagnoses:   Induction of labor          Discharge Diagnosis:   Normal spontaneous vaginal delivery  Intrauterine pregnancy at 39 weeks gestation          Procedures:   Procedure(s): No additional procedures performed       No procedures performed during this admission           Medications Prior to Admission:     Prescriptions Prior to Admission   Medication Sig Dispense Refill Last Dose     Prenatal MV-Min-Fe Fum-FA-DHA (PRENATAL 1 PO)    Past Week at Unknown time             Discharge Medications:   No medications were prescribed on discharge          Consultations:   No consultations were requested during this admission          Brief History of Labor:   Admit for induction of labor.            Hospital Course:   The patient's hospital course was unremarkable.  On discharge, her pain was well controlled. Vaginal bleeding is similar to peak menstrual flow.  Voiding without difficulty.  Ambulating well and tolerating a normal diet.  No fever.  Breastfeeding well.  Infant is stable.  No bowel movement yet.*  She was discharged on post-partum day #2.    Post-partum hemoglobin:   Hemoglobin   Date Value Ref Range Status   2018 14.0 11.7 - 15.7 g/dL Final             Discharge Instructions and Follow-Up:   Discharge diet: Regular   Discharge activity: No sex for 6 week(s)   Discharge follow-up: Follow up with primary care provider in 6 weeks   Wound care: Ice to area for comfort           Discharge Disposition:   Discharged to home      Attestation:  I have reviewed today's vital signs, notes, medications, labs and imaging.    Noreen Lockett MD

## 2018-12-03 NOTE — PLAN OF CARE
D: VSS, assessments WDL.   I: Pt. received complete discharge paperwork and was given times of last dose for all discharge medications in writing on discharge medication sheets.  Discharge teaching included home medication, pain management, activity restrictions, postpartum cares, and signs and symptoms of infection.    A: Discharge outcomes on care plan met.  Mother states understanding and comfort with self and baby cares.  P: Pt. discharged to home.  Pt. was discharged with baby, and bands were checked at time of discharge.  Pt. was accompanied by , nurse and baby, and left with personal belongings.  Home care referral sent.  Pt. to follow up with OB per MD order.  Pt. had no further questions at the time of discharge and no unmet needs were identified.

## 2018-12-03 NOTE — LACTATION NOTE
Follow up visit.  Infant has been feeding well.  Naomy had no concerns or questions.  Reviewed outpatient lactation resources for follow up when discharged if needed.  Nipples are intact.  She was excited to go home today.    Zara Ovalle RN, IBCLC

## 2018-12-03 NOTE — PLAN OF CARE
Problem: Patient Care Overview  Goal: Plan of Care/Patient Progress Review  Outcome: No Change  Patient doing well.  Pain well controlled with tylenol and ibuprofen every 6 hours.  Voiding without difficulty.  Using ice packs prn to perineum.  Up in room with steady gait.  Continue to work on breast feeding.  Encouraged to call with questions/concerns.

## 2018-12-05 ENCOUNTER — DOCUMENTATION ONLY (OUTPATIENT)
Dept: CARE COORDINATION | Facility: CLINIC | Age: 33
End: 2018-12-05

## 2018-12-05 NOTE — PROGRESS NOTES
[facility] will be sharing updates with you on Maternal Child Health Referral requests for home care services.  This is for care coordination purposes and alert you to referral status.  We received the referral for  Naomy Alvarez; MRN [id number] and want to update you:      [facility] has made four attempts to contact patient by phone and text message over the last 11 days.   We have not had any response from patient.  Final message was left advising patient to follow up with Primary Care Providers for mom and baby.  Ordering MD and Primary Care Providers for mom and baby notified.     Sincerely [facility]  Nicolette Infante  [phone]

## 2019-02-15 ENCOUNTER — HEALTH MAINTENANCE LETTER (OUTPATIENT)
Age: 34
End: 2019-02-15

## 2019-11-07 LAB
ABO + RH BLD: NORMAL
ABO + RH BLD: NORMAL
BLD GP AB SCN SERPL QL: NEGATIVE
C TRACH DNA SPEC QL PROBE+SIG AMP: NEGATIVE
HBV SURFACE AG SERPL QL IA: NEGATIVE
HIV 1+2 AB+HIV1 P24 AG SERPL QL IA: NON REACTIVE
N GONORRHOEA DNA SPEC QL PROBE+SIG AMP: NEGATIVE
RUBELLA ABY IGG: 1.09
RUBELLA ANTIBODY IGG QUANTITATIVE: NORMAL IU/ML

## 2020-03-10 ENCOUNTER — HEALTH MAINTENANCE LETTER (OUTPATIENT)
Age: 35
End: 2020-03-10

## 2020-04-04 LAB — TREPONEMA ANTIBODIES: NON REACTIVE

## 2020-06-04 LAB — GROUP B STREP PCR: NEGATIVE

## 2020-06-12 ENCOUNTER — MEDICAL CORRESPONDENCE (OUTPATIENT)
Dept: HEALTH INFORMATION MANAGEMENT | Facility: CLINIC | Age: 35
End: 2020-06-12

## 2020-06-13 ENCOUNTER — AMBULATORY - HEALTHEAST (OUTPATIENT)
Dept: FAMILY MEDICINE | Facility: CLINIC | Age: 35
End: 2020-06-13

## 2020-06-13 DIAGNOSIS — Z20.822 ENCOUNTER FOR LABORATORY TESTING FOR COVID-19 VIRUS: ICD-10-CM

## 2020-06-14 ENCOUNTER — AMBULATORY - HEALTHEAST (OUTPATIENT)
Dept: FAMILY MEDICINE | Facility: CLINIC | Age: 35
End: 2020-06-14

## 2020-06-14 DIAGNOSIS — Z20.822 ENCOUNTER FOR LABORATORY TESTING FOR COVID-19 VIRUS: ICD-10-CM

## 2020-06-15 ENCOUNTER — COMMUNICATION - HEALTHEAST (OUTPATIENT)
Dept: FAMILY MEDICINE | Facility: CLINIC | Age: 35
End: 2020-06-15

## 2020-06-16 ENCOUNTER — HOSPITAL ENCOUNTER (INPATIENT)
Facility: CLINIC | Age: 35
LOS: 1 days | Discharge: HOME HEALTH | End: 2020-06-17
Attending: SPECIALIST | Admitting: SPECIALIST
Payer: COMMERCIAL

## 2020-06-16 ENCOUNTER — ANESTHESIA (OUTPATIENT)
Dept: OBGYN | Facility: CLINIC | Age: 35
End: 2020-06-16
Payer: COMMERCIAL

## 2020-06-16 ENCOUNTER — ANESTHESIA EVENT (OUTPATIENT)
Dept: OBGYN | Facility: CLINIC | Age: 35
End: 2020-06-16
Payer: COMMERCIAL

## 2020-06-16 LAB
ABO + RH BLD: NORMAL
ABO + RH BLD: NORMAL
ERYTHROCYTE [DISTWIDTH] IN BLOOD BY AUTOMATED COUNT: 13.3 % (ref 10–15)
HCT VFR BLD AUTO: 40 % (ref 35–47)
HGB BLD-MCNC: 13.5 G/DL (ref 11.7–15.7)
MCH RBC QN AUTO: 32 PG (ref 26.5–33)
MCHC RBC AUTO-ENTMCNC: 33.8 G/DL (ref 31.5–36.5)
MCV RBC AUTO: 95 FL (ref 78–100)
PLATELET # BLD AUTO: 177 10E9/L (ref 150–450)
RBC # BLD AUTO: 4.22 10E12/L (ref 3.8–5.2)
SPECIMEN EXP DATE BLD: NORMAL
T PALLIDUM AB SER QL: NONREACTIVE
WBC # BLD AUTO: 8.9 10E9/L (ref 4–11)

## 2020-06-16 PROCEDURE — 00HU33Z INSERTION OF INFUSION DEVICE INTO SPINAL CANAL, PERCUTANEOUS APPROACH: ICD-10-PCS | Performed by: ANESTHESIOLOGY

## 2020-06-16 PROCEDURE — 86780 TREPONEMA PALLIDUM: CPT | Performed by: SPECIALIST

## 2020-06-16 PROCEDURE — 86900 BLOOD TYPING SEROLOGIC ABO: CPT | Performed by: SPECIALIST

## 2020-06-16 PROCEDURE — 86901 BLOOD TYPING SEROLOGIC RH(D): CPT | Performed by: SPECIALIST

## 2020-06-16 PROCEDURE — 25000125 ZZHC RX 250

## 2020-06-16 PROCEDURE — 12000035 ZZH R&B POSTPARTUM

## 2020-06-16 PROCEDURE — 10907ZC DRAINAGE OF AMNIOTIC FLUID, THERAPEUTIC FROM PRODUCTS OF CONCEPTION, VIA NATURAL OR ARTIFICIAL OPENING: ICD-10-PCS | Performed by: OBSTETRICS & GYNECOLOGY

## 2020-06-16 PROCEDURE — 25000128 H RX IP 250 OP 636: Performed by: ANESTHESIOLOGY

## 2020-06-16 PROCEDURE — 72200001 ZZH LABOR CARE VAGINAL DELIVERY SINGLE

## 2020-06-16 PROCEDURE — 37000011 ZZH ANESTHESIA WARD SERVICE

## 2020-06-16 PROCEDURE — 25800030 ZZH RX IP 258 OP 636: Performed by: SPECIALIST

## 2020-06-16 PROCEDURE — 36415 COLL VENOUS BLD VENIPUNCTURE: CPT | Performed by: SPECIALIST

## 2020-06-16 PROCEDURE — 3E0R3BZ INTRODUCTION OF ANESTHETIC AGENT INTO SPINAL CANAL, PERCUTANEOUS APPROACH: ICD-10-PCS | Performed by: ANESTHESIOLOGY

## 2020-06-16 PROCEDURE — 25000132 ZZH RX MED GY IP 250 OP 250 PS 637: Performed by: SPECIALIST

## 2020-06-16 PROCEDURE — 3E033VJ INTRODUCTION OF OTHER HORMONE INTO PERIPHERAL VEIN, PERCUTANEOUS APPROACH: ICD-10-PCS | Performed by: SPECIALIST

## 2020-06-16 PROCEDURE — 85027 COMPLETE CBC AUTOMATED: CPT | Performed by: SPECIALIST

## 2020-06-16 RX ORDER — SODIUM CHLORIDE 9 MG/ML
INJECTION, SOLUTION INTRAVENOUS CONTINUOUS
Status: DISCONTINUED | OUTPATIENT
Start: 2020-06-16 | End: 2020-06-16

## 2020-06-16 RX ORDER — NALOXONE HYDROCHLORIDE 0.4 MG/ML
.1-.4 INJECTION, SOLUTION INTRAMUSCULAR; INTRAVENOUS; SUBCUTANEOUS
Status: DISCONTINUED | OUTPATIENT
Start: 2020-06-16 | End: 2020-06-16

## 2020-06-16 RX ORDER — IBUPROFEN 400 MG/1
800 TABLET, FILM COATED ORAL EVERY 6 HOURS PRN
Status: DISCONTINUED | OUTPATIENT
Start: 2020-06-16 | End: 2020-06-17 | Stop reason: HOSPADM

## 2020-06-16 RX ORDER — ONDANSETRON 4 MG/1
4 TABLET, ORALLY DISINTEGRATING ORAL EVERY 6 HOURS PRN
Status: DISCONTINUED | OUTPATIENT
Start: 2020-06-16 | End: 2020-06-16

## 2020-06-16 RX ORDER — ONDANSETRON 2 MG/ML
4 INJECTION INTRAMUSCULAR; INTRAVENOUS EVERY 6 HOURS PRN
Status: DISCONTINUED | OUTPATIENT
Start: 2020-06-16 | End: 2020-06-16

## 2020-06-16 RX ORDER — OXYTOCIN 10 [USP'U]/ML
10 INJECTION, SOLUTION INTRAMUSCULAR; INTRAVENOUS
Status: DISCONTINUED | OUTPATIENT
Start: 2020-06-16 | End: 2020-06-17 | Stop reason: HOSPADM

## 2020-06-16 RX ORDER — OXYTOCIN/0.9 % SODIUM CHLORIDE 30/500 ML
100 PLASTIC BAG, INJECTION (ML) INTRAVENOUS CONTINUOUS
Status: DISCONTINUED | OUTPATIENT
Start: 2020-06-16 | End: 2020-06-17 | Stop reason: HOSPADM

## 2020-06-16 RX ORDER — TRANEXAMIC ACID 10 MG/ML
1 INJECTION, SOLUTION INTRAVENOUS EVERY 30 MIN PRN
Status: DISCONTINUED | OUTPATIENT
Start: 2020-06-16 | End: 2020-06-17 | Stop reason: HOSPADM

## 2020-06-16 RX ORDER — OXYTOCIN/0.9 % SODIUM CHLORIDE 30/500 ML
340 PLASTIC BAG, INJECTION (ML) INTRAVENOUS CONTINUOUS PRN
Status: DISCONTINUED | OUTPATIENT
Start: 2020-06-16 | End: 2020-06-17 | Stop reason: HOSPADM

## 2020-06-16 RX ORDER — ACETAMINOPHEN 325 MG/1
650 TABLET ORAL EVERY 4 HOURS PRN
Status: DISCONTINUED | OUTPATIENT
Start: 2020-06-16 | End: 2020-06-17 | Stop reason: HOSPADM

## 2020-06-16 RX ORDER — MODIFIED LANOLIN
OINTMENT (GRAM) TOPICAL
Status: DISCONTINUED | OUTPATIENT
Start: 2020-06-16 | End: 2020-06-17 | Stop reason: HOSPADM

## 2020-06-16 RX ORDER — FENTANYL CITRATE 50 UG/ML
50-100 INJECTION, SOLUTION INTRAMUSCULAR; INTRAVENOUS
Status: DISCONTINUED | OUTPATIENT
Start: 2020-06-16 | End: 2020-06-16

## 2020-06-16 RX ORDER — CARBOPROST TROMETHAMINE 250 UG/ML
250 INJECTION, SOLUTION INTRAMUSCULAR
Status: DISCONTINUED | OUTPATIENT
Start: 2020-06-16 | End: 2020-06-16

## 2020-06-16 RX ORDER — OXYTOCIN/0.9 % SODIUM CHLORIDE 30/500 ML
PLASTIC BAG, INJECTION (ML) INTRAVENOUS
Status: COMPLETED
Start: 2020-06-16 | End: 2020-06-16

## 2020-06-16 RX ORDER — IBUPROFEN 400 MG/1
800 TABLET, FILM COATED ORAL
Status: DISCONTINUED | OUTPATIENT
Start: 2020-06-16 | End: 2020-06-16

## 2020-06-16 RX ORDER — METHYLERGONOVINE MALEATE 0.2 MG/ML
200 INJECTION INTRAVENOUS
Status: DISCONTINUED | OUTPATIENT
Start: 2020-06-16 | End: 2020-06-16

## 2020-06-16 RX ORDER — OXYTOCIN/0.9 % SODIUM CHLORIDE 30/500 ML
1-24 PLASTIC BAG, INJECTION (ML) INTRAVENOUS CONTINUOUS
Status: DISCONTINUED | OUTPATIENT
Start: 2020-06-16 | End: 2020-06-16

## 2020-06-16 RX ORDER — ROPIVACAINE HYDROCHLORIDE 2 MG/ML
10 INJECTION, SOLUTION EPIDURAL; INFILTRATION; PERINEURAL ONCE
Status: DISCONTINUED | OUTPATIENT
Start: 2020-06-16 | End: 2020-06-16

## 2020-06-16 RX ORDER — NALBUPHINE HYDROCHLORIDE 10 MG/ML
2.5-5 INJECTION, SOLUTION INTRAMUSCULAR; INTRAVENOUS; SUBCUTANEOUS EVERY 6 HOURS PRN
Status: DISCONTINUED | OUTPATIENT
Start: 2020-06-16 | End: 2020-06-16

## 2020-06-16 RX ORDER — OXYTOCIN/0.9 % SODIUM CHLORIDE 30/500 ML
100-340 PLASTIC BAG, INJECTION (ML) INTRAVENOUS CONTINUOUS PRN
Status: DISCONTINUED | OUTPATIENT
Start: 2020-06-16 | End: 2020-06-16

## 2020-06-16 RX ORDER — METHYLERGONOVINE MALEATE 0.2 MG/ML
200 INJECTION INTRAVENOUS
Status: DISCONTINUED | OUTPATIENT
Start: 2020-06-16 | End: 2020-06-17 | Stop reason: HOSPADM

## 2020-06-16 RX ORDER — TRANEXAMIC ACID 10 MG/ML
1 INJECTION, SOLUTION INTRAVENOUS EVERY 30 MIN PRN
Status: DISCONTINUED | OUTPATIENT
Start: 2020-06-16 | End: 2020-06-16

## 2020-06-16 RX ORDER — HYDROCORTISONE 2.5 %
CREAM (GRAM) TOPICAL 3 TIMES DAILY PRN
Status: DISCONTINUED | OUTPATIENT
Start: 2020-06-16 | End: 2020-06-17 | Stop reason: HOSPADM

## 2020-06-16 RX ORDER — EPHEDRINE SULFATE 50 MG/ML
5 INJECTION, SOLUTION INTRAMUSCULAR; INTRAVENOUS; SUBCUTANEOUS
Status: DISCONTINUED | OUTPATIENT
Start: 2020-06-16 | End: 2020-06-16

## 2020-06-16 RX ORDER — OXYTOCIN 10 [USP'U]/ML
10 INJECTION, SOLUTION INTRAMUSCULAR; INTRAVENOUS
Status: DISCONTINUED | OUTPATIENT
Start: 2020-06-16 | End: 2020-06-16

## 2020-06-16 RX ORDER — MISOPROSTOL 200 UG/1
800 TABLET ORAL
Status: DISCONTINUED | OUTPATIENT
Start: 2020-06-16 | End: 2020-06-17 | Stop reason: HOSPADM

## 2020-06-16 RX ORDER — ACETAMINOPHEN 325 MG/1
650 TABLET ORAL EVERY 4 HOURS PRN
Status: DISCONTINUED | OUTPATIENT
Start: 2020-06-16 | End: 2020-06-16

## 2020-06-16 RX ORDER — NALOXONE HYDROCHLORIDE 0.4 MG/ML
.1-.4 INJECTION, SOLUTION INTRAMUSCULAR; INTRAVENOUS; SUBCUTANEOUS
Status: DISCONTINUED | OUTPATIENT
Start: 2020-06-16 | End: 2020-06-17 | Stop reason: HOSPADM

## 2020-06-16 RX ORDER — OXYCODONE AND ACETAMINOPHEN 5; 325 MG/1; MG/1
1 TABLET ORAL
Status: DISCONTINUED | OUTPATIENT
Start: 2020-06-16 | End: 2020-06-16

## 2020-06-16 RX ORDER — BISACODYL 10 MG
10 SUPPOSITORY, RECTAL RECTAL DAILY PRN
Status: DISCONTINUED | OUTPATIENT
Start: 2020-06-18 | End: 2020-06-17 | Stop reason: HOSPADM

## 2020-06-16 RX ORDER — SODIUM CHLORIDE, SODIUM LACTATE, POTASSIUM CHLORIDE, CALCIUM CHLORIDE 600; 310; 30; 20 MG/100ML; MG/100ML; MG/100ML; MG/100ML
INJECTION, SOLUTION INTRAVENOUS CONTINUOUS
Status: DISCONTINUED | OUTPATIENT
Start: 2020-06-16 | End: 2020-06-16

## 2020-06-16 RX ORDER — DOCUSATE SODIUM 100 MG/1
100 CAPSULE, LIQUID FILLED ORAL 2 TIMES DAILY
Status: DISCONTINUED | OUTPATIENT
Start: 2020-06-16 | End: 2020-06-17 | Stop reason: HOSPADM

## 2020-06-16 RX ADMIN — IBUPROFEN 800 MG: 400 TABLET, FILM COATED ORAL at 19:17

## 2020-06-16 RX ADMIN — SODIUM CHLORIDE, POTASSIUM CHLORIDE, SODIUM LACTATE AND CALCIUM CHLORIDE: 600; 310; 30; 20 INJECTION, SOLUTION INTRAVENOUS at 08:57

## 2020-06-16 RX ADMIN — SODIUM CHLORIDE, POTASSIUM CHLORIDE, SODIUM LACTATE AND CALCIUM CHLORIDE 1000 ML: 600; 310; 30; 20 INJECTION, SOLUTION INTRAVENOUS at 12:34

## 2020-06-16 RX ADMIN — ACETAMINOPHEN 650 MG: 325 TABLET, FILM COATED ORAL at 19:17

## 2020-06-16 RX ADMIN — Medication 12 ML/HR: at 13:18

## 2020-06-16 RX ADMIN — DOCUSATE SODIUM 100 MG: 100 CAPSULE, LIQUID FILLED ORAL at 19:17

## 2020-06-16 RX ADMIN — Medication 2 MILLI-UNITS/MIN: at 08:59

## 2020-06-16 RX ADMIN — SODIUM CHLORIDE, POTASSIUM CHLORIDE, SODIUM LACTATE AND CALCIUM CHLORIDE: 600; 310; 30; 20 INJECTION, SOLUTION INTRAVENOUS at 16:24

## 2020-06-16 ASSESSMENT — ENCOUNTER SYMPTOMS
SEIZURES: 0
DYSRHYTHMIAS: 0

## 2020-06-16 ASSESSMENT — LIFESTYLE VARIABLES: TOBACCO_USE: 0

## 2020-06-16 ASSESSMENT — MIFFLIN-ST. JEOR: SCORE: 1638.01

## 2020-06-16 NOTE — L&D DELIVERY NOTE
Patient presented for elective induction. Pitocin begun, AROM done for clear fluid. Epidural placed with good relief.  In active labor, developed early decelerations, IUPC placed with adequate contractions. Progressed to complete.   male, Apgars 9 & 9, wt NA over intact perineum from OP presentation. Placenta spont, intact, 3CV.  QBL 50 ml  Mother and baby doing well.

## 2020-06-16 NOTE — ANESTHESIA PREPROCEDURE EVALUATION
Anesthesia Pre-Procedure Evaluation    Patient: Naomy Alvarez   MRN: [id number] : 1985          Preoperative Diagnosis: IUP/Labor     Procedure: RHETT     Past Medical History:   Diagnosis Date     Abnormal Pap smear of cervix 2019    hpv negative     Past Surgical History:   Procedure Laterality Date     HC TOOTH EXTRACTION W/FORCEP      [place] Teeth Removed     No Known Allergies  Social History     Tobacco Use     Smoking status: Never Smoker     Smokeless tobacco: Never Used   Substance Use Topics     Alcohol use: No     Prior to Admission medications    Medication Sig Start Date End Date Taking? Authorizing Provider   omeprazole (PRILOSEC) 20 MG DR capsule Take 20 mg by mouth daily   Yes Reported, Patient   Prenatal MV-Min-Fe Fum-FA-DHA (PRENATAL 1 PO)     Reported, Patient     Current Facility-Administered Medications Ordered in Epic   Medication Dose Route Frequency Last Rate Last Dose     acetaminophen (TYLENOL) tablet 650 mg  650 mg Oral Q4H PRN         carboprost (HEMABATE) injection 250 mcg  250 mcg Intramuscular Once PRN         ePHEDrine injection 5 mg  5 mg Intravenous Q3 Min PRN         fentaNYL (PF) (SUBLIMAZE) injection  mcg   mcg Intravenous Q1H PRN         fentaNYL (SUBLIMAZE) 2 mcg/mL, bupivacaine (MARCAINE) 0.125% in NS premix for PCEA  12 mL/hr EPIDURAL PCEA   12 mL/hr at 20 1318     ibuprofen (ADVIL/MOTRIN) tablet 800 mg  800 mg Oral Once PRN         IF subcutaneous (SQ) Unfractionated heparin (UFH) ordered for thromboprophylaxis   Does not apply See Admin Instructions        Or     IF intravenous (IV) Unfractionated heparin (UFH) ordered   Does not apply See Admin Instructions        Or     IF LOW-dose Low molecular weight heparin (LMWH) thromboprophylaxis ordered   Does not apply See Admin Instructions        Or     IF HIGHER-dose Low molecular weight heparin (LMWH) thromboprophylaxis ordered   Does not apply See Admin Instructions         lactated ringers BOLUS  "1,000 mL  1,000 mL Intravenous Once PRN        Or     lactated ringers BOLUS 500 mL  500 mL Intravenous Once PRN         lactated ringers BOLUS 250 mL  250 mL Intravenous Once PRN         lactated ringers BOLUS 500 mL  500 mL Intravenous Once PRN         lactated ringers infusion   Intravenous Continuous   Stopped at 06/16/20 1233     lidocaine 1 % 0.1-20 mL  0.1-20 mL Subcutaneous Once PRN         medication instruction   Does not apply Continuous PRN         Medication Instructions: misoprostol (CYTOTEC)- Nurse to discuss ordering with provider, if needed. Ordered via \"OB misoprostol (CYTOTEC) Postpartum Hemorrhage PANEL\"   Does not apply Continuous PRN         methylergonovine (METHERGINE) injection 200 mcg  200 mcg Intramuscular Once PRN         nalbuphine (NUBAIN) injection 2.5-5 mg  2.5-5 mg Intravenous Q6H PRN         naloxone (NARCAN) injection 0.1-0.4 mg  0.1-0.4 mg Intravenous Q2 Min PRN         naloxone (NARCAN) injection 0.1-0.4 mg  0.1-0.4 mg Intravenous Q2 Min PRN         ondansetron (ZOFRAN) injection 4 mg  4 mg Intravenous Q6H PRN         ondansetron (ZOFRAN-ODT) ODT tab 4 mg  4 mg Oral Q6H PRN        Or     ondansetron (ZOFRAN) injection 4 mg  4 mg Intravenous Q6H PRN         Opioid plan postpartum - medication instruction   Does not apply Continuous PRN         oxyCODONE-acetaminophen (PERCOCET) 5-325 MG per tablet 1 tablet  1 tablet Oral Once PRN         oxytocin (PITOCIN) 30 units in 500 mL 0.9% NaCl infusion  100-340 mL/hr Intravenous Continuous PRN         oxytocin (PITOCIN) 30 units in 500 mL 0.9% NaCl infusion  1-24 emily-units/min Intravenous Continuous 20 mL/hr at 06/16/20 1351 20 emily-units/min at 06/16/20 1351     oxytocin (PITOCIN) injection 10 Units  10 Units Intramuscular Once PRN         ROPivacaine (NAROPIN) injection 10 mL  10 mL EPIDURAL Once         tranexamic acid 1 g in 100 mL 0.7% NaCl IV bag (premix)  1 g Intravenous Q30 Min PRN         No current Epic-ordered outpatient " "medications on file.       lactated ringers Stopped (06/16/20 5045)     - MEDICATION INSTRUCTIONS -       - MEDICATION INSTRUCTIONS -       - MEDICATION INSTRUCTIONS -       oxytocin in 0.9% NaCl       oxytocin in 0.9% NaCl 20 emily-units/min (06/16/20 1351)     Recent Labs   Lab Test 06/16/20  0911 07/16/18 04/16/18   WBC 8.9  --   --    HGB 13.5 14.0  --      --  202     Recent Labs   Lab Test 06/16/20  0911   ABO O   RH Pos     RECENT LABS:     Anesthesia Evaluation     . Pt has had prior anesthetic. Type: Regional    No history of anesthetic complications          ROS/MED HX    ENT/Pulmonary:      (-) tobacco use, asthma and sleep apnea   Neurologic:      (-) seizures and CVA   Cardiovascular:        (-) hypertension, syncope, arrhythmias and irregular heartbeat/palpitations   METS/Exercise Tolerance:     Hematologic: Comments: Denies EZ bleeding/bruising/blood thinner use        Musculoskeletal:         GI/Hepatic:     (+) GERD      (-) liver disease   Renal/Genitourinary:      (-) renal disease   Endo:      (-) Type II DM and thyroid disease   Psychiatric:         Infectious Disease:         Malignancy:         Other:                                 Lab Results   Component Value Date    WBC 8.9 06/16/2020    HGB 13.5 06/16/2020    HCT 40.0 06/16/2020     06/16/2020       Preop Vitals  BP Readings from Last 3 Encounters:   06/16/20 98/55   12/03/18 113/70   04/13/17 107/61    Pulse Readings from Last 3 Encounters:   12/01/18 78   04/13/17 50      Resp Readings from Last 3 Encounters:   06/16/20 16   12/03/18 16   04/13/17 16    SpO2 Readings from Last 3 Encounters:   06/16/20 100%   12/01/18 99%   04/11/17 97%      Temp Readings from Last 1 Encounters:   06/16/20 37.2  C (99  F) (Temporal)    Ht Readings from Last 1 Encounters:   06/16/20 1.88 m (6' 2\")      Wt Readings from Last 1 Encounters:   06/16/20 78.9 kg (174 lb)    Estimated body mass index is 22.34 kg/m  as calculated from the " "following:    Height as of this encounter: 1.88 m (6' 2\").    Weight as of this encounter: 78.9 kg (174 lb).       Anesthesia Plan      History & Physical Review      ASA Status:  2 .        Plan for Epidural            Postoperative Care      Consents  Anesthetic plan, risks, benefits and alternatives discussed with:  Patient and Spouse..                 Ayaan Geiger MD  "

## 2020-06-16 NOTE — PROGRESS NOTES
Data: Patient admitted to room 213 at 0800. Patient is a . Prenatal record reviewed.     Gestational age 39w1d. Vital signs per doc flowsheet. Fetal movement present. Patient reports Induction Of Labor  as reason for admission. Support person Dante present.

## 2020-06-16 NOTE — ANESTHESIA PROCEDURE NOTES
Procedure note : epidural catheter  Staff -   Anesthesiologist:  Ayaan Geiger MD      Performed By: anesthesiologist and Anesthesiologist        Pre-Procedure  Performed by Ayaan Geiger MD  Location: OB      Pre-Anesthestic Checklist: patient identified, IV checked, risks and benefits discussed, informed consent, monitors and equipment checked, pre-op evaluation and at physician/surgeon's request    Timeout  Correct Patient: Yes   Correct Procedure: Yes   Correct Site: Yes   Correct Laterality: N/A   Correct Position: Yes   Site Marked: N/A   .   Procedure Documentation    .    Procedure: epidural catheter, .   Patient Position:sitting Insertion Site:L2-3  (midline approach) Injection technique: LORT saline   Local skin infiltrated with 3 mL of 1% lidocaine.  PRESTON at 6 cm    Patient Prep/Sterile Barriers; mask, sterile gloves, povidone-iodine 7.5% surgical scrub, patient draped.  .  Needle: Touhy needle   Needle Gauge: 17.    Needle Length (Inches) 3.5   # of attempts: 1 and # of redirects: : 0. .    Catheter: 19 G . .  Catheter threaded easily  3 cm epidural space.  9 cm at skin.   .    Assessment/Narrative  Paresthesias: No.  .  .  Aspiration negative for heme or CSF  . Test dose of 3 mL lidocaine 1.5% w/ 1:200,000 epinephrine at 13:17.  Test dose negative for signs of intravascular, subdural or intrathecal injection. Comments:  Pt tolerated well.   Taped sterile and secure.   Ropivacaine bolus (documented separately in MAR) done >3 minutes after test dose, in increments, with intermittent negative aspirations.    FHTs stable post-procedure.   No complications.

## 2020-06-16 NOTE — PROGRESS NOTES
"Labor Progress    Contractions getting stronger.    ,/59   Temp 99  F (37.2  C) (Temporal)   Resp 14   Ht 1.88 m (6' 2\")   Wt 78.9 kg (174 lb)   Breastfeeding Yes   BMI 22.34 kg/m       FHR - cat 1  TOCO - q 2 minutes  Cervix -  2-3/80/-2/soft/posterior  AROM completed, clear fluid.    Plan:  Continue pitocin.  Planning epidural.      Sulma Christianson MD  6/16/20  "

## 2020-06-16 NOTE — PROGRESS NOTES
"Comfortable with epidural, not feeling pressure  BP 98/55   Temp 99  F (37.2  C) (Temporal)   Resp 16   Ht 1.88 m (6' 2\")   Wt 78.9 kg (174 lb)   SpO2 100%   Breastfeeding Yes   BMI 22.34 kg/m    Ctxs every 3  FHR moderate-minimal variability with early decelerations with most contractions  Cx 6/70/-1  IUPC placed since no change over the past hour; ctxs only 25 mm    ASSESSMENT:  IUP@ 39+1 weeks  Repetitive variable decelerations    PLAN:  IV fluid flush  Possible amnio infusion if decelerations don't resolve  "

## 2020-06-16 NOTE — H&P
"  2020    Naomy Alvarez  [id number]            OB Admit History & Physical      Ms. Alvarez  is here for elective induction.    She has noticed normal FM    No LMP recorded. Patient is pregnant.   Her Estimated Date of Delivery: 2020  , making her 39w1d  wks.      Estimated body mass index is 22.34 kg/m  as calculated from the following:    Height as of this encounter: 1.88 m (6' 2\").    Weight as of this encounter: 78.9 kg (174 lb).  Her prenatal course has been  uncomplicated    See prenatal for labs.  - GBBS, Rubella  Immune, RH +    Estimated fetal weight= 7.5#       She is a 35 year old   Her OB history:   OB History    Para Term  AB Living   4 2 2 0 1 2   SAB TAB Ectopic Multiple Live Births   1 0 0 0 2      # Outcome Date GA Lbr Wojciech/2nd Weight Sex Delivery Anes PTL Lv   4 Current            3 Term 18 39w4d 05:14 / 00:14 3.24 kg (7 lb 2.3 oz) F Vag-Spont EPI N JENNIFER      Name: LEI ALVAREZ      Apgar1: 9  Apgar5: 9   2 Term 17 41w1d 02:55 / 01:31 3.36 kg (7 lb 6.5 oz) M Vag-Spont EPI N JENNIFER      Name: LEI ALVAREZ      Apgar1: 8  Apgar5: 9   1 SAB 16                    Past Medical History:   Diagnosis Date     Abnormal Pap smear of cervix 2019    hpv negative          Past Surgical History:   Procedure Laterality Date     HC TOOTH EXTRACTION W/FORCEP      [place] Teeth Removed         No current outpatient medications on file.       Allergies: Patient has no known allergies.      REVIEW OF SYSTEMS:  NEUROLOGIC:  Negative  EYES:  Negative  ENT:  Negative  GI:  Negative  BREAST:  Negative  :  Negative  GYN:  Negative  CV:  Negative  PULMONARY:  Negative  MUSCULOSKELETAL:  Negative  PSYCH:  Negative        Social History     Socioeconomic History     Marital status:      Spouse name: Not on file     Number of children: Not on file     Years of education: Not on file     Highest education level: Not on file   Occupational History     Not on file "   Social Needs     Financial resource strain: Not on file     Food insecurity     Worry: Not on file     Inability: Not on file     Transportation needs     Medical: Not on file     Non-medical: Not on file   Tobacco Use     Smoking status: Never Smoker     Smokeless tobacco: Never Used   Substance and Sexual Activity     Alcohol use: No     Drug use: No     Sexual activity: Yes   Lifestyle     Physical activity     Days per week: Not on file     Minutes per session: Not on file     Stress: Not on file   Relationships     Social connections     Talks on phone: Not on file     Gets together: Not on file     Attends Yarsanism service: Not on file     Active member of club or organization: Not on file     Attends meetings of clubs or organizations: Not on file     Relationship status: Not on file     Intimate partner violence     Fear of current or ex partner: Not on file     Emotionally abused: Not on file     Physically abused: Not on file     Forced sexual activity: Not on file   Other Topics Concern     Not on file   Social History Narrative     Not on file      No family history on file.          Vitals:   FHT reactive,  Min ctxs    Alert Awake in NAD  HEENT grossly normal  Neck: no lymphadenopathy or thryoidomegaly  Lungs clear  Back no spinal or CVAT  Heart RRR  ABD gravid, vertex on exam   Pelvic:  no fluid noted, no blood noted  Cervix is 2 cm / 50 % effaced at -2 station  EXT:  trace edema or calf tenderness  Neuro:  intact    Assessment:  IUP at 39w1d      Plan:  Pitocin induction  Epidural when desired  Anticipate     [unfilled]      MATTI LAMA MD MD  Dept of OB/GYN  2020

## 2020-06-16 NOTE — PLAN OF CARE
Dr Alfredo in dept since 1430.  Pt has been having repetitive variables not resolved by repositioning or fluid bolus.  Moderate variability.  Order received to start amnioinfusion but pt stated she was feeling pressure.  Nurse checked pt and she was 9cm.  Dr jeremy carrillo notified and order to hold amnioinfusion.  Pt progressed to complete at 1715.

## 2020-06-17 VITALS
BODY MASS INDEX: 23.57 KG/M2 | DIASTOLIC BLOOD PRESSURE: 67 MMHG | HEART RATE: 47 BPM | OXYGEN SATURATION: 99 % | RESPIRATION RATE: 16 BRPM | HEIGHT: 72 IN | SYSTOLIC BLOOD PRESSURE: 117 MMHG | TEMPERATURE: 97.4 F | WEIGHT: 174 LBS

## 2020-06-17 PROCEDURE — 25000132 ZZH RX MED GY IP 250 OP 250 PS 637: Performed by: SPECIALIST

## 2020-06-17 RX ORDER — IBUPROFEN 600 MG/1
600 TABLET, FILM COATED ORAL EVERY 6 HOURS PRN
Qty: 30 TABLET | Refills: 1 | Status: SHIPPED | OUTPATIENT
Start: 2020-06-17

## 2020-06-17 RX ADMIN — ACETAMINOPHEN 650 MG: 325 TABLET, FILM COATED ORAL at 01:27

## 2020-06-17 RX ADMIN — DOCUSATE SODIUM 100 MG: 100 CAPSULE, LIQUID FILLED ORAL at 07:36

## 2020-06-17 RX ADMIN — ACETAMINOPHEN 650 MG: 325 TABLET, FILM COATED ORAL at 07:37

## 2020-06-17 RX ADMIN — IBUPROFEN 800 MG: 400 TABLET, FILM COATED ORAL at 01:29

## 2020-06-17 RX ADMIN — ACETAMINOPHEN 650 MG: 325 TABLET, FILM COATED ORAL at 14:08

## 2020-06-17 RX ADMIN — IBUPROFEN 800 MG: 400 TABLET, FILM COATED ORAL at 14:08

## 2020-06-17 RX ADMIN — IBUPROFEN 800 MG: 400 TABLET, FILM COATED ORAL at 07:36

## 2020-06-17 NOTE — PLAN OF CARE
Data: Naomy Alvarez transferred to postpartum room 427 via wheelchair at 2015. Baby transferred via parent's arms.  Action: Receiving unit notified of transfer: Yes. Patient and family notified of room change. Report given to CLEVELAND Garcia RN at 2020. Belongings sent to receiving unit. Accompanied by Registered Nurse. Oriented patient to surroundings. Call light within reach. ID bands double-checked with receiving RN.  Response: Patient tolerated transfer and is stable.

## 2020-06-17 NOTE — PLAN OF CARE
Pt. admitted from L&D  @ 2220 via wheelchair and transferred to bed with stand by assist. Pt. arrived with baby and was accompanied by  Dante and arrived with personal belongings. Report was taken from JOCELINE Chi in L&D. VSS. Fundus is firm and midline.  Vaginal bleeding is minimal.  SL in place.  Pt. oriented to the room and call light system.

## 2020-06-17 NOTE — PLAN OF CARE
Vital signs stable. Postpartum assessment WDL. Pain controlled with tylenol and ibuprofen. Patient voiding without difficulty. SL removed. Breastfeeding on cue with minimal assist. Patient and infant bonding well. Will continue with current plan of care.

## 2020-06-17 NOTE — PROGRESS NOTES
"POSTPARTUM NOTE, VAGINAL DELIVERY    POSTPARTUM DAY # 1    Naomy feels well.   No concerns.  Denies heavy bleeding.   Infant son is well.  Feels ok with going home this evening.      PE  Vitals: /60   Pulse (!) 48   Temp 97.8  F (36.6  C)   Resp 16   Ht 1.88 m (6' 2\")   Wt 78.9 kg (174 lb)   SpO2 99%   Breastfeeding Yes   BMI 22.34 kg/m        General - appears well  Abdomen - firm, nontender, below umbilicus      A: PPD #1 s/p vaginal delivery.    P: Continue routine pp care.  Plan for discharge this evening.      Sulma Christianson MD  [facility]  6/17/20          "

## 2020-06-17 NOTE — PLAN OF CARE
D: VSS, assessments WDL.   I: Pt. received complete discharge paperwork and home medications as filled by discharge pharmacy to include ibuprofen.  Pt. was given times of last dose for all discharge medications in writing on discharge medication sheets.  Discharge teaching included home medication, pain management, activity restrictions, postpartum cares, and signs and symptoms of infection.    A: Discharge outcomes on care plan met.  Mother states understanding and comfort with self and baby cares.  P: Pt. discharged to home.  Pt. was discharged with baby, and bands were checked at time of discharge.  Pt. was accompanied by , nurse and baby, and left with personal belongings.  Home care ordered.  Pt. to follow up with OB per MD order.  Pt. had no further questions at the time of discharge and no unmet needs were identified.

## 2020-06-17 NOTE — LACTATION NOTE
Lactation visit with Naomy, significant other Ramirez & baby boy Usman. Getting ready for discharge. Naomy reports feeding is going well. She has no concerns.  Reviewed milk supply and engorgement. Reviewed typical timeline of milk supply initiation and progression over first 3-5 days postpartum. Discussed comfort measures for engorgement, plugged duct treatment, and warning signs of breast infection. Discussed cluster feeding, typical  feeding patterns, how to wake a sleepy baby for feedings.    Feeding plan: Recommend unlimited, frequent breast feedings: At least 8 - 12 times every 24 hours. Avoid pacifiers and supplementation with formula unless medically indicated. Encouraged use of feeding log and to record feedings, and void/stool patterns. Naomy has a pump for home use. Follow up with Pediatric Services. Reviewed outpatient lactation resources. Naomy & Ramirez appreciative of visit.    Tea Alanis, RN-C, IBCLC, MNN, PHN, BSN

## 2020-06-17 NOTE — DISCHARGE SUMMARY
[facility] Discharge Summary    Naomy Alvarez MRN# [id number]   Age: 35 year old YOB: 1985     Date of Admission:  2020  Date of Discharge::  20  Admitting Physician:  Ana Alfredo MD  Discharge Physician:  Sulma Christianson MD     Home clinic: [facility]          Admission Diagnoses:   Pregnant, 39 weeeks          Discharge Diagnosis:   Vaginal delivery          Procedures:   Procedure(s): Vaginal delivery, induction of labor                  Medications Prior to Admission:   prilosec  Prenatal vitamin          Discharge Medications:   Ibuprofen 600 mg q 6 hr prn  PNV               Brief History of Labor:   Patient presented for elective induction. Pitocin,  AROM, epidural.   male, Apgars 9 & 9, wt NA over intact perineum from OP presentation. Placenta spont, intact, 3CV.  QBL 50 ml           Hospital Course:   The patient's hospital course was unremarkable. Discharged planned for today (evening of PPD#1).    Post-partum hemoglobin:   Hemoglobin   Date Value Ref Range Status   2020 13.5 11.7 - 15.7 g/dL Final             Discharge Instructions and Follow-Up:   Discharge diet: regular   Discharge activity: Pelvic rest x 6 weeks   Discharge follow-up: Postpartum visit with Dr. Alfredo in 4-6 weeks           Discharge Disposition:   Discharged to home      Attestation:  Patient examined today    Sulma Christianson MD   20

## 2020-06-18 NOTE — ANESTHESIA POSTPROCEDURE EVALUATION
Patient: Naomy Alvarez    * No procedures listed *    Diagnosis:* No pre-op diagnosis entered *  Diagnosis Additional Information: No value filed.    Anesthesia Type:  No value filed.    Note:  Anesthesia Post Evaluation         Comments: Patient discharged from hospital prior to post-epidural evaluation        Last vitals:  Vitals:    06/17/20 0822 06/17/20 0845 06/17/20 1517   BP:  107/57 117/67   Pulse:  53 (!) 47   Resp: 16 16 16   Temp:  36.3  C (97.4  F) 36.3  C (97.4  F)   SpO2:            Electronically Signed By: Jignesh Vivas MD  June 17, 2020  9:24 PM

## 2020-12-27 ENCOUNTER — HEALTH MAINTENANCE LETTER (OUTPATIENT)
Age: 35
End: 2020-12-27

## 2021-04-24 ENCOUNTER — HEALTH MAINTENANCE LETTER (OUTPATIENT)
Age: 36
End: 2021-04-24

## 2021-06-20 NOTE — LETTER
Letter by Grace Bates RN at      Author: Grace Bates RN Service: -- Author Type: --    Filed:  Encounter Date: 6/15/2020 Status: (Other)       6/15/2020        Naomy Alvarez  [address]  [place] [address]    This letter provides a written record that you were tested for COVID-19 on 6/14/20.     Your result was negative.    This means that we didnt find the virus that causes COVID-19 in your sample. A test may show negative when you do actually have the virus. This can happen when the virus is in the early stages of infection, before you feel illness symptoms.    Even if you dont have symptoms, they may still appear. For safety, its very important to follow these rules.    Keep yourself away from others (self-isolation):      Stay home. Dont go to work, school or anywhere else.     Stay in your own room (and use your own bathroom), if you can.    Stay away from others in your home. No hugging, kissing or shaking hands. No visitors.    Clean high touch surfaces often (doorknobs, counters, handles, etc.). Use a household cleaning spray or wipes.    Cover your mouth and nose with a mask, tissue or washcloth to avoid spreading germs.    Wash your hands and face often with soap and water.    Stay in self-isolation until you meet ALL of the guidelines below:    1. You have had no fever for at least 72 hours (that is 3 full days of no fever without the use of medicine that reduces fevers), AND  2. other symptoms (such as cough, shortness of breath) have gotten better, AND  3. at least 10 days have passed since your symptoms first appeared.    Going back to work  Check with your employer for any guidelines to follow for going back to work.    Employers: This document serves as formal notice that your employee tested negative for COVID-19, as of the testing date shown above.    For questions regarding this letter or your Negative COVID-19 result, call [phone] between 8A to 6:30P (M-F) and 10A to  6:30P (weekends).

## 2021-10-09 ENCOUNTER — HEALTH MAINTENANCE LETTER (OUTPATIENT)
Age: 36
End: 2021-10-09

## 2022-05-16 ENCOUNTER — HEALTH MAINTENANCE LETTER (OUTPATIENT)
Age: 37
End: 2022-05-16

## 2022-09-11 ENCOUNTER — HEALTH MAINTENANCE LETTER (OUTPATIENT)
Age: 37
End: 2022-09-11

## 2023-06-03 ENCOUNTER — HEALTH MAINTENANCE LETTER (OUTPATIENT)
Age: 38
End: 2023-06-03